# Patient Record
Sex: FEMALE | Race: WHITE | NOT HISPANIC OR LATINO | URBAN - METROPOLITAN AREA
[De-identification: names, ages, dates, MRNs, and addresses within clinical notes are randomized per-mention and may not be internally consistent; named-entity substitution may affect disease eponyms.]

---
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
## 2024-06-18 ENCOUNTER — OFFICE VISIT (OUTPATIENT)
Dept: URGENT CARE | Facility: CLINIC | Age: 24
End: 2024-06-18
Payer: COMMERCIAL

## 2024-06-18 VITALS
OXYGEN SATURATION: 99 % | TEMPERATURE: 97.2 F | DIASTOLIC BLOOD PRESSURE: 58 MMHG | BODY MASS INDEX: 20.14 KG/M2 | SYSTOLIC BLOOD PRESSURE: 102 MMHG | RESPIRATION RATE: 18 BRPM | HEIGHT: 64 IN | HEART RATE: 82 BPM | WEIGHT: 118 LBS

## 2024-06-18 DIAGNOSIS — H00.015 HORDEOLUM EXTERNUM OF LEFT LOWER EYELID: Primary | ICD-10-CM

## 2024-06-18 PROCEDURE — S9083 URGENT CARE CENTER GLOBAL: HCPCS

## 2024-06-18 PROCEDURE — G0382 LEV 3 HOSP TYPE B ED VISIT: HCPCS

## 2024-06-18 RX ORDER — SULFAMETHOXAZOLE AND TRIMETHOPRIM 800; 160 MG/1; MG/1
1 TABLET ORAL EVERY 12 HOURS SCHEDULED
Qty: 14 TABLET | Refills: 0 | Status: SHIPPED | OUTPATIENT
Start: 2024-06-18 | End: 2024-06-25

## 2024-06-18 RX ORDER — NORETHINDRONE ACETATE AND ETHINYL ESTRADIOL, ETHINYL ESTRADIOL AND FERROUS FUMARATE 1MG-10(24)
1 KIT ORAL DAILY
COMMUNITY
Start: 2024-05-21 | End: 2025-05-21

## 2024-06-18 NOTE — PATIENT INSTRUCTIONS
Please Follow up with Opthalmology  A local provider for this includes:      Bhargav Cunningham  [address]  [place] [address] [phone]

## 2024-06-18 NOTE — PROGRESS NOTES
[facility]        NAME: Noemi Cheng is a 24 y.o. female  : 2000    MRN: [id number]  DATE: 2024  TIME: 6:57 PM    Assessment and Plan   Hordeolum externum of left lower eyelid [H00.015]  1. Hordeolum externum of left lower eyelid  sulfamethoxazole-trimethoprim (BACTRIM DS) 800-160 mg per tablet        Discussed problem with patient.  Large stye to patient's left lower lid.  Vision 20/30 OS, 20/50 OD, 20/20 bilaterally.  Eyes are PERRL.  Prescribing Bactrim for this issue but recommended ophthalmology follow-up as well.  Continue warm compresses.    Patient Instructions       Follow up with PCP in 3-5 days.  Proceed to  ER if symptoms worsen.    If tests are performed, our office will contact you with results only if changes need to made to the care plan discussed with you at the visit. [facility]    Chief Complaint     Chief Complaint   Patient presents with   • Eye Problem     C/o possible stye on left eye, sensitive to light, Onset x7 days ago. Pt states she has been using warm compresses everyday.          History of Present Illness       24-year-old male presents with a possible stye to the left eye for the last week.  States she has been self treating with warm compresses but today had some light sensitivity at work.  Denies any headaches, nausea, lightheadedness, dizziness.  Very minor eye pain as well.  Presents because she is going vacation next weeks and wants this taken care of.  Denies any fevers or chills as well    Eye Problem   Associated symptoms include photophobia. Pertinent negatives include no eye discharge, eye redness, fever or itching.       Review of Systems   Review of Systems   Constitutional:  Negative for appetite change, chills, fatigue and fever.   Eyes:  Positive for photophobia. Negative for pain, discharge, redness, itching and visual disturbance.   Respiratory:  Negative for cough, shortness of breath,  "wheezing and stridor.    Cardiovascular:  Negative for chest pain and palpitations.         Current Medications       Current Outpatient Medications:   •  Lo Loestrin Fe 1 MG-10 MCG / 10 MCG TABS, Take 1 tablet by mouth daily, Disp: , Rfl:   •  sulfamethoxazole-trimethoprim (BACTRIM DS) 800-160 mg per tablet, Take 1 tablet by mouth every 12 (twelve) hours for 7 days, Disp: 14 tablet, Rfl: 0    Current Allergies     Allergies as of 06/18/2024   • (No Known Allergies)            The following portions of the patient's history were reviewed and updated as appropriate: allergies, current medications, past family history, past medical history, past social history, past surgical history and problem list.     Past Medical History:   Diagnosis Date   • Known health problems: none        Past Surgical History:   Procedure Laterality Date   • WISDOM TOOTH EXTRACTION         Family History   Problem Relation Age of Onset   • No Known Problems Mother    • No Known Problems Father          Medications have been verified.        Objective   /58   Pulse 82   Temp (!) 97.2 °F (36.2 °C)   Resp 18   Ht 5' 3.5\" (1.613 m)   Wt 53.5 kg (118 lb)   SpO2 99%   BMI 20.57 kg/m²        Physical Exam     Physical Exam  Vitals and nursing note reviewed.   Constitutional:       General: She is not in acute distress.     Appearance: Normal appearance. She is normal weight. She is not ill-appearing, toxic-appearing or diaphoretic.   Eyes:      General: Lids are normal. Lids are everted, no foreign bodies appreciated. Vision grossly intact. Gaze aligned appropriately. No allergic shiner or scleral icterus.        Left eye: Hordeolum present.No foreign body or discharge.      Extraocular Movements: Extraocular movements intact.      Left eye: Normal extraocular motion and no nystagmus.      Conjunctiva/sclera: Conjunctivae normal.      Left eye: Left conjunctiva is not injected.      Pupils: Pupils are equal, round, and reactive to " light. Pupils are equal.      Right eye: Pupil is round, reactive and not sluggish.      Left eye: Pupil is round, reactive and not sluggish.      Slit lamp exam:     Right eye: No photophobia.      Left eye: Anterior chamber quiet. No photophobia.      Comments: Eyes are PERRL, stye to patient's left lower lid approximately 0.5 cm with the head.  No active drainage.  No periorbital tenderness.  Full eye range of motion without any pain complaints.   Cardiovascular:      Rate and Rhythm: Normal rate and regular rhythm.      Pulses: Normal pulses.      Heart sounds: Normal heart sounds. No murmur heard.     No friction rub. No gallop.   Pulmonary:      Effort: Pulmonary effort is normal. No respiratory distress.      Breath sounds: Normal breath sounds. No stridor. No wheezing, rhonchi or rales.   Chest:      Chest wall: No tenderness.   Neurological:      Mental Status: She is alert.